# Patient Record
Sex: MALE | Race: WHITE | NOT HISPANIC OR LATINO | URBAN - METROPOLITAN AREA
[De-identification: names, ages, dates, MRNs, and addresses within clinical notes are randomized per-mention and may not be internally consistent; named-entity substitution may affect disease eponyms.]

---

## 2020-01-28 ENCOUNTER — OFFICE VISIT - HEALTHEAST (OUTPATIENT)
Dept: FAMILY MEDICINE | Facility: CLINIC | Age: 25
End: 2020-01-28

## 2020-01-28 ENCOUNTER — COMMUNICATION - HEALTHEAST (OUTPATIENT)
Dept: TELEHEALTH | Facility: CLINIC | Age: 25
End: 2020-01-28

## 2020-01-28 DIAGNOSIS — Z00.00 ROUTINE GENERAL MEDICAL EXAMINATION AT A HEALTH CARE FACILITY: ICD-10-CM

## 2020-01-28 DIAGNOSIS — Z11.4 SCREENING FOR HIV (HUMAN IMMUNODEFICIENCY VIRUS): ICD-10-CM

## 2020-01-28 DIAGNOSIS — Z13.1 SCREENING FOR DIABETES MELLITUS: ICD-10-CM

## 2020-01-28 DIAGNOSIS — Z23 NEED FOR VACCINATION: ICD-10-CM

## 2020-01-28 DIAGNOSIS — M54.2 ANTERIOR NECK PAIN: ICD-10-CM

## 2020-01-28 DIAGNOSIS — E78.5 HYPERLIPIDEMIA, UNSPECIFIED HYPERLIPIDEMIA TYPE: ICD-10-CM

## 2020-01-28 ASSESSMENT — MIFFLIN-ST. JEOR: SCORE: 1993.9

## 2020-01-28 NOTE — ASSESSMENT & PLAN NOTE
This patient presents for annual exam.  He has a concern about anterior right neck pain as discussed elsewhere.  He is a distant history of elevated measurements of lipids.  This was checked when he was a child because of elevated cholesterol levels with his father.  Unclear if he actually had a diagnosis of familial hyper cholesterolemia.  We will recheck his lipid levels with fasting lab work in the near future.  He exercises on a regular basis.  He does not drink in excess of alcohol.  No concerns about mental health.  Return to clinic in 1 year, sooner if his anterior neck pain does not improve.

## 2020-01-28 NOTE — ASSESSMENT & PLAN NOTE
No palpable abnormalities in the area of pain.  This loosely overlaps with the medial aspect of the upper body of the sternocleidomastoid muscle.  There is no obvious lymphadenopathy.  No evidence of deep tissue infection such as peritonsillar abscess.  Given his benign exam and the relevant history of dead lifting weight lifting, inclined to think that this is a strain of the sternocleidomastoid muscle and recommended time as well as anti-inflammatories as an initial treatment strategy.  The area of discomfort does loosely correspond to the carotid artery although there was not a pulsatile quality to his description of symptoms normal physical exam.  If he has worsening of symptoms, will consider an ultrasound of that area of his anatomy.

## 2020-01-28 NOTE — ASSESSMENT & PLAN NOTE
No recent measurements.  This does not appear to represent a diagnosis of familial hypercholesterolemia.  He did have high cholesterolin 2010 which was reviewed through the [facility] everywhere record.  -Fasting lipids in the near future.

## 2021-06-04 VITALS
DIASTOLIC BLOOD PRESSURE: 70 MMHG | RESPIRATION RATE: 20 BRPM | HEART RATE: 72 BPM | SYSTOLIC BLOOD PRESSURE: 130 MMHG | HEIGHT: 74 IN | TEMPERATURE: 97.6 F | WEIGHT: 211 LBS | OXYGEN SATURATION: 98 % | BODY MASS INDEX: 27.08 KG/M2

## 2021-06-05 NOTE — PATIENT INSTRUCTIONS - HE
- take naproxen (Aleve): 440 mg (2 pills) twice daily for 7-14 days.  If your stomach starts to hurts with this medication, reduce the dose to 1 pill.  Discontinue if discomfort continues.  The primary goal of this therapy is to reduce inflammation.  Pain relief is often experienced but is a secondary goal.

## 2021-06-05 NOTE — PROGRESS NOTES
MALE PREVENTATIVE EXAM    Assessment and Plan:     Problem List Items Addressed This Visit     Hyperlipidemia     No recent measurements.  This does not appear to represent a diagnosis of familial hypercholesterolemia.  He did have high cholesterol in 2010 which was reviewed through the [facility] everywhere record.  -Fasting lipids in the near future.         Relevant Orders    Lipid Profile    Routine general medical examination at a health care facility - Primary     This patient presents for annual exam.  He has a concern about anterior right neck pain as discussed elsewhere.  He is a distant history of elevated measurements of lipids.  This was checked when he was a child because of elevated cholesterol levels with his father.  Unclear if he actually had a diagnosis of familial hyper cholesterolemia.  We will recheck his lipid levels with fasting lab work in the near future.  He exercises on a regular basis.  He does not drink in excess of alcohol.  No concerns about mental health.  Return to clinic in 1 year, sooner if his anterior neck pain does not improve.         Relevant Orders    Tdap vaccine,  8yo or older,  IM    Lipid Profile    HIV Antigen/Antibody Screening [place]    Glucose    Anterior neck pain     No palpable abnormalities in the area of pain.  This loosely overlaps with the medial aspect of the upper body of the sternocleidomastoid muscle.  There is no obvious lymphadenopathy.  No evidence of deep tissue infection such as peritonsillar abscess.  Given his benign exam and the relevant history of dead lifting weight lifting, inclined to think that this is a strain of the sternocleidomastoid muscle and recommended time as well as anti-inflammatories as an initial treatment strategy.  The area of discomfort does loosely correspond to the carotid artery although there was not a pulsatile quality to his description of symptoms normal physical exam.  If he has worsening of symptoms, will consider an  "ultrasound of that area of his anatomy.           Other Visit Diagnoses     Need for vaccination        Relevant Orders    Tdap vaccine,  6yo or older,  IM    Screening for HIV (human immunodeficiency virus)        Relevant Orders    HIV Antigen/Antibody Screening [place]    Screening for diabetes mellitus        Relevant Orders    Glucose        Next follow up:  Return in about 4 weeks (around 2/25/2020) for recheck if not improving.    Immunization Review  Adult Imm Review: Due today, orders placed  Pt does not use tobacco products   I discussed the following with the patient:   Adult Healthy Living: Importance of regular exercise  Getting adequate sleep  Stress management  Herbal medications/alternative medical therapies    I have had an Advance Directives discussion with the patient.    Subjective:   Chief Complaint: Maximiliano Singh is an 24 y.o. male here for a preventative health visit.     HPI:      Right anterior neck pain.  \"Sore.\"  4 days duration. He woke up with this pain.  No pain with swallowing.  Not improving.  No previous episodes.  No recent activity changes.  No trauma.  Deadlift workout the day before.    Healthy Habits  Are you taking a daily aspirin? No  Do you typically exercising at least 40 min, 3-4 times per week?  Yes  Do you usually eat at least 4 servings of fruit and vegetables a day, include whole grains and fiber and avoid regularly eating high fat foods? NO  Have you had an eye exam in the past two years? NO  Do you see a dentist twice per year? NO  Do you have any concerns regarding sleep? No    Safety Screen  If you own firearms, are they secured in a locked gun cabinet or with trigger locks? The patient does not own any firearms  Do you feel you are safe where you are living?: Yes (1/28/2020  1:11 PM)  Do you feel you are safe in your relationship(s)?: Yes (1/28/2020  1:11 PM)      Review of Systems:  Please see above.  The rest of the review of systems are negative for all " "systems.     Cancer Screening     Patient has no health maintenance due at this time        Patient Care Team:  Ronnie Cazares MD as PCP - General (Family Medicine)        History     Reviewed By Date/Time Sections Reviewed    Ronnie Cazares MD 1/28/2020  1:35 PM Medical, Surgical    Leemicahel Juli M, LPN 1/28/2020  1:17 PM Family    Juli Tony LPN 1/28/2020  1:16 PM Juli Rivera LPN 1/28/2020  1:15 PM Surgical, Tobacco, Alcohol, Drug Use, Sexual Activity        Objective:   Vital Signs:   Visit Vitals  /70 (Patient Site: Left Arm, Patient Position: Sitting, Cuff Size: Adult Large)   Pulse 72   Temp 97.6  F (36.4  C) (Oral)   Resp 20   Ht 6' 1.5\" (1.867 m)   Wt 211 lb (95.7 kg)   SpO2 98% Comment: room air   BMI 27.46 kg/m           PHYSICAL EXAM  Physical Exam  Vitals signs reviewed.   Constitutional:       General: He is not in acute distress.     Appearance: He is well-developed.   HENT:      Head: Normocephalic and atraumatic.      Right Ear: External ear normal.      Left Ear: External ear normal.      Nose: Nose normal.      Mouth/Throat:      Pharynx: No oropharyngeal exudate.   Eyes:      General: No scleral icterus.        Right eye: No discharge.         Left eye: No discharge.      Conjunctiva/sclera: Conjunctivae normal.      Pupils: Pupils are equal, round, and reactive to light.   Neck:      Musculoskeletal: Normal range of motion. No neck rigidity or muscular tenderness.      Thyroid: No thyromegaly.      Vascular: No carotid bruit.   Cardiovascular:      Rate and Rhythm: Normal rate and regular rhythm.      Heart sounds: Normal heart sounds. No murmur. No friction rub. No gallop.    Pulmonary:      Effort: Pulmonary effort is normal. No respiratory distress.      Breath sounds: Normal breath sounds. No wheezing.   Abdominal:      General: There is no distension.      Palpations: Abdomen is soft. There is no mass.      Tenderness: There is no " abdominal tenderness.   Musculoskeletal: Normal range of motion.   Skin:     General: Skin is warm.   Neurological:      Mental Status: He is alert and oriented to person, place, and time.      Cranial Nerves: No cranial nerve deficit.      Motor: No abnormal muscle tone.      Deep Tendon Reflexes: Reflexes are normal and symmetric.   Psychiatric:         Behavior: Behavior normal.         Thought Content: Thought content normal.         Judgment: Judgment normal.          Medication List      as of January 28, 2020  1:53 PM     You have not been prescribed any medications.         Additional Screenings Completed Today:

## 2021-06-16 PROBLEM — E78.5 HYPERLIPIDEMIA: Status: ACTIVE | Noted: 2020-01-28

## 2021-06-16 PROBLEM — Z00.00 ROUTINE GENERAL MEDICAL EXAMINATION AT A HEALTH CARE FACILITY: Status: ACTIVE | Noted: 2020-01-28

## 2021-06-16 PROBLEM — M54.2 ANTERIOR NECK PAIN: Status: ACTIVE | Noted: 2020-01-28
